# Patient Record
Sex: MALE | Race: OTHER | HISPANIC OR LATINO | URBAN - METROPOLITAN AREA
[De-identification: names, ages, dates, MRNs, and addresses within clinical notes are randomized per-mention and may not be internally consistent; named-entity substitution may affect disease eponyms.]

---

## 2024-10-01 ENCOUNTER — EMERGENCY (EMERGENCY)
Facility: HOSPITAL | Age: 49
LOS: 1 days | Discharge: HOME | End: 2024-10-01
Attending: EMERGENCY MEDICINE
Payer: SELF-PAY

## 2024-10-01 VITALS
TEMPERATURE: 99 F | HEART RATE: 110 BPM | OXYGEN SATURATION: 95 % | DIASTOLIC BLOOD PRESSURE: 80 MMHG | RESPIRATION RATE: 20 BRPM | SYSTOLIC BLOOD PRESSURE: 124 MMHG

## 2024-10-01 VITALS
RESPIRATION RATE: 20 BRPM | SYSTOLIC BLOOD PRESSURE: 142 MMHG | HEART RATE: 99 BPM | OXYGEN SATURATION: 96 % | DIASTOLIC BLOOD PRESSURE: 88 MMHG

## 2024-10-01 PROCEDURE — 70450 CT HEAD/BRAIN W/O DYE: CPT | Mod: MC

## 2024-10-01 PROCEDURE — 70450 CT HEAD/BRAIN W/O DYE: CPT | Mod: 26,MC

## 2024-10-01 PROCEDURE — 72125 CT NECK SPINE W/O DYE: CPT | Mod: 26,MC

## 2024-10-01 PROCEDURE — 72125 CT NECK SPINE W/O DYE: CPT | Mod: MC

## 2024-10-01 PROCEDURE — 70486 CT MAXILLOFACIAL W/O DYE: CPT | Mod: 26,MC

## 2024-10-01 PROCEDURE — 82962 GLUCOSE BLOOD TEST: CPT

## 2024-10-01 PROCEDURE — 99284 EMERGENCY DEPT VISIT MOD MDM: CPT

## 2024-10-01 PROCEDURE — 99285 EMERGENCY DEPT VISIT HI MDM: CPT | Mod: 25

## 2024-10-01 PROCEDURE — 70486 CT MAXILLOFACIAL W/O DYE: CPT | Mod: MC

## 2024-10-01 NOTE — ED PROVIDER NOTE - PHYSICAL EXAMINATION
General: Awake, alert, lying in bed in NAD. Clinically intoxicated  HEENT: Abrasion above left eyebrow. No scleral icterus or conjunctival injection. EOMI. Moist mucous membranes. Oropharynx clear.   Neck:. Soft and supple.  Cardiac: RRR, Peripheral pulses 2+ and symmetric. No LE edema.  Resp: Lungs CTAB. No accessory muscle use  Abd: Soft, non-tender, non-distended. No guarding, rebound, or rigidity.  Back: Spine midline and non-tender.   MSK: No chest wall tenderness. Pelvis stable. Extremities full ROM without pain. Compartments soft.   Skin: Facial abrasion per above  Neuro: AO x 4. Moves all extremities symmetrically. Motor strength and sensation grossly intact.  Psych: Appropriate mood and affect

## 2024-10-01 NOTE — ED PROVIDER NOTE - CLINICAL SUMMARY MEDICAL DECISION MAKING FREE TEXT BOX
Approximately 34-year-old male unknown medical history presents after being found on the ground in setting of reported alcohol intoxication.  CT imaging, fingerstick, if unremarkable will monitor for sobriety

## 2024-10-01 NOTE — ED ADULT NURSE NOTE - OBJECTIVE STATEMENT
Alert-The patient is alert, awake and responds to voice. The patient is oriented to time, place, and person. The triage nurse is able to obtain subjective information. Patient brought in by ambulance  after being found on the side walk by bystanders, patient last seen 20 minutes prior. abrasion noted to forehead, GCS 10 as per EMS on Triage, c-collar in place. Priority Ct done. Hematoma noted to left forehead, bleeding controlled. patient A&Ox3 at present, admits to drinking Vodka, doesn't remember how he fell. Denies any other complaints.

## 2024-10-01 NOTE — ED ADULT TRIAGE NOTE - CHIEF COMPLAINT QUOTE
Pt BIBA, found on sidewalk by bystanders in neighborhood, pt last seen 20 min prior, abrasion noted to forehead, admits to drinking alcohol, GCS 10 as per EMS, c-collar in place

## 2024-10-01 NOTE — ED PROVIDER NOTE - OBJECTIVE STATEMENT
Approximately 34-year-old male unknown medical history presents after being found on the ground in front of a house outside just prior to arrival.  Per EMS, patient was last seen walking by bystanders about 20 minutes prior, subsequently found lying on the floor with facial trauma.  Patient presently without complaints, although noting he drank some alcohol earlier today, subsequently lost his balance and fell.  Priority CT ordered

## 2024-10-01 NOTE — ED PROVIDER NOTE - PROGRESS NOTE DETAILS
Patient reassesed--no complaints.  Vital signs normal.  Resting comfortably.  A&Ox3.  Speech clear. Gait normal.  Clinically sober. Alexey Salgado MD Pt eloped prior to reassessment or repeat vital signs. No IV in place. Johnson Salgado MD Pt eloped prior to reassessment, discussion of workup results, or repeat vital signs. No IV in place. Johnson Salgado MD Pt eloped prior to reassessment, discussion of workup results, or repeat vital signs. No IV in place. Attempted to reach pt via phone number on file with assistance of  but no answer. Johnson Salgado MD

## 2024-10-01 NOTE — ED PROVIDER NOTE - ATTENDING CONTRIBUTION TO CARE
34M presenting for evaluation after being found outside intoxicated with possible facial trauma from fall. On exam with mild swelling along nasal bridge poss fx; called as a priority CT given facial trauma and intox, though appears alert, o/w intact. If negative imaging, likely OK to observe for sobriety and have outpt follow up

## 2024-10-01 NOTE — ED ADULT NURSE NOTE - NSFALLRISKINTERV_ED_ALL_ED
